# Patient Record
Sex: FEMALE | Race: WHITE | Employment: UNEMPLOYED | URBAN - METROPOLITAN AREA
[De-identification: names, ages, dates, MRNs, and addresses within clinical notes are randomized per-mention and may not be internally consistent; named-entity substitution may affect disease eponyms.]

---

## 2019-06-30 ENCOUNTER — APPOINTMENT (OUTPATIENT)
Dept: GENERAL RADIOLOGY | Age: 54
End: 2019-06-30
Payer: COMMERCIAL

## 2019-06-30 ENCOUNTER — HOSPITAL ENCOUNTER (EMERGENCY)
Age: 54
Discharge: HOME | End: 2019-06-30
Payer: COMMERCIAL

## 2019-06-30 VITALS
WEIGHT: 170 LBS | HEART RATE: 63 BPM | TEMPERATURE: 98.1 F | RESPIRATION RATE: 16 BRPM | OXYGEN SATURATION: 95 % | DIASTOLIC BLOOD PRESSURE: 94 MMHG | SYSTOLIC BLOOD PRESSURE: 136 MMHG

## 2019-06-30 DIAGNOSIS — M79.602 LEFT ARM PAIN: Primary | ICD-10-CM

## 2019-06-30 PROCEDURE — 73060 X-RAY EXAM OF HUMERUS: CPT

## 2019-06-30 PROCEDURE — 99212 OFFICE O/P EST SF 10 MIN: CPT

## 2019-06-30 RX ORDER — NAPROXEN 500 MG/1
500 TABLET ORAL 2 TIMES DAILY
Qty: 14 TABLET | Refills: 0 | Status: SHIPPED | OUTPATIENT
Start: 2019-06-30 | End: 2019-07-07

## 2019-06-30 RX ORDER — ATORVASTATIN CALCIUM 40 MG/1
40 TABLET, FILM COATED ORAL DAILY
COMMUNITY

## 2019-06-30 ASSESSMENT — PAIN DESCRIPTION - ORIENTATION: ORIENTATION: LEFT

## 2019-06-30 ASSESSMENT — PAIN SCALES - GENERAL: PAINLEVEL_OUTOF10: 7

## 2019-06-30 ASSESSMENT — PAIN DESCRIPTION - PAIN TYPE: TYPE: ACUTE PAIN;CHRONIC PAIN

## 2019-06-30 ASSESSMENT — PAIN DESCRIPTION - LOCATION: LOCATION: SHOULDER;ARM

## 2019-06-30 NOTE — ED PROVIDER NOTES
bowel sounds. Extremities: There is some tenderness in the left mid humeral region. No edema. Neurovascularly intact distally. The shoulder and elbow are nontender as is the distal arm. Skin: No rash. Neuro: No gross neurologic deficits. Lab / Imaging Results   (All laboratory and radiology results have been personally reviewed by myself)  Labs:  No results found for this visit on 06/30/19. Imaging: All Radiology results interpreted by Radiologist unless otherwise noted. XR HUMERUS LEFT (MIN 2 VIEWS)   Final Result      No significant radiographic abnormality. ED Course / Medical Decision Making   Medications - No data to display       Consult(s):   None    Procedure(s):   None. Differential Diagnosis: Is extensive but includes fracture, dislocation, nonvisualized/occult fracture, tendon/ligament injury, foreign body, soft tissue injury, etc.    MDM:   This is a 52 y.o. female who presents following a medical fall with injury to the nondominant left arm 1 month ago. This increased a few days ago after picking up her grandchild. On exam, there is some tenderness over the humeral region but is neurovascularly intact distally. X-rays show no fracture or dislocation. We did discuss DVT as a possibility as well however she has no risk factors for this and has had no recent instrumentation of the arm or neck. There is no asymmetry or edema on exam.  Home-going with NSAIDs and orthopedic follow-up. Counseling: I discussed the differential, results and discharge plan with the patient and/or family/friend/caregiver if present. I emphasized the importance of follow-up with the physician I referred them to in the timeframe recommended. I explained reasons for the patient to return to the Emergency Department. Additional verbal discharge instructions were also given and discussed with the patient to supplement those generated by the EMR.  We also discussed medications that were prescribed (if